# Patient Record
Sex: MALE
[De-identification: names, ages, dates, MRNs, and addresses within clinical notes are randomized per-mention and may not be internally consistent; named-entity substitution may affect disease eponyms.]

---

## 2022-10-11 ENCOUNTER — APPOINTMENT (OUTPATIENT)
Dept: PULMONOLOGY | Facility: CLINIC | Age: 62
End: 2022-10-11

## 2022-10-11 VITALS
HEIGHT: 69 IN | TEMPERATURE: 98 F | WEIGHT: 180 LBS | BODY MASS INDEX: 26.66 KG/M2 | HEART RATE: 57 BPM | SYSTOLIC BLOOD PRESSURE: 115 MMHG | OXYGEN SATURATION: 97 % | DIASTOLIC BLOOD PRESSURE: 74 MMHG

## 2022-10-11 DIAGNOSIS — A92.30 WEST NILE VIRUS INFECTION, UNSPECIFIED: ICD-10-CM

## 2022-10-11 DIAGNOSIS — G25.81 RESTLESS LEGS SYNDROME: ICD-10-CM

## 2022-10-11 DIAGNOSIS — Z78.9 OTHER SPECIFIED HEALTH STATUS: ICD-10-CM

## 2022-10-11 DIAGNOSIS — Z87.891 PERSONAL HISTORY OF NICOTINE DEPENDENCE: ICD-10-CM

## 2022-10-11 PROBLEM — Z00.00 ENCOUNTER FOR PREVENTIVE HEALTH EXAMINATION: Status: ACTIVE | Noted: 2022-10-11

## 2022-10-11 PROCEDURE — 99203 OFFICE O/P NEW LOW 30 MIN: CPT

## 2022-10-11 RX ORDER — PREGABALIN 200 MG/1
200 CAPSULE ORAL
Refills: 0 | Status: ACTIVE | COMMUNITY

## 2022-10-11 NOTE — CONSULT LETTER
[Dear  ___] : Dear  [unfilled], [Consult Letter:] : I had the pleasure of evaluating your patient, [unfilled]. [Please see my note below.] : Please see my note below. [Consult Closing:] : Thank you very much for allowing me to participate in the care of this patient.  If you have any questions, please do not hesitate to contact me. [Sincerely,] : Sincerely, [FreeTextEntry3] : Devorah Dale MD\par \par [employer] [facility]\par Pulmonary, Critical Care, and Sleep Medicine\par

## 2022-10-11 NOTE — PHYSICAL EXAM
No [General Appearance - Well Developed] : well developed [Normal Appearance] : normal appearance [Well Groomed] : well groomed [General Appearance - Well Nourished] : well nourished [No Deformities] : no deformities [General Appearance - In No Acute Distress] : no acute distress [Normal Conjunctiva] : the conjunctiva exhibited no abnormalities [III] : III [Neck Appearance] : the appearance of the neck was normal [] : no respiratory distress [Respiration, Rhythm And Depth] : normal respiratory rhythm and effort [Abnormal Walk] : normal gait [Nail Clubbing] : no clubbing of the fingernails [Cyanosis, Localized] : no localized cyanosis [Skin Color & Pigmentation] : normal skin color and pigmentation [Oriented To Time, Place, And Person] : oriented to person, place, and time [Impaired Insight] : insight and judgment were intact

## 2022-10-11 NOTE — REVIEW OF SYSTEMS
[Lower Extremity Discomfort] : lower extremity discomfort [Irresistible urge to move legs] : irresistible urge to move legs because of lower extremity discomfort [LE discomfort relieved by movement] : lower extremity discomfort relieved by movement [Late day/ Evening symptoms] : late day/evening symptoms [Sleep Disturbances due to LE symptoms] : ~T sleep disturbances due to lower extremity symptoms [Negative] : Psychiatric

## 2022-10-11 NOTE — HISTORY OF PRESENT ILLNESS
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 Started on Lyrica with improvement.  Never had a PSG; pending one in 2 weeks. Does snore and this is bothersome to his wife. Has tried laser and is now using Excite OKSANA. Is pending MAD. Recent history of [place]. \par \par Neuro: Lukasz Stern\par  [ESS] : 7

## 2022-10-11 NOTE — ASSESSMENT
[FreeTextEntry1] : REVIEWED\par 1. HST 4/2022: AHI 4% 9.3; AHI 3% 19.7; T88 <1%\par 2. Ferritin 2021 55; 2022 183\par \par 61yo with "shaky legs" referred for evaluation for snoring. Referred by Dr. Callahan. His clinical history is consistent with RLS as a PSG is required for a diagnosis of PLMD. However, treatment is similar; can c/w Lyrica since he has benefit. Ferritin levels are now above >70 ng/mL after supplementation. He does have between mild and moderate OKSANA which will explain his snoring; excite OKSANA is not an approved treatment modality for moderate OKSANA, but MAD is. He should wait for his PSG results prior to deciding to proceed with MAD treatment; he may have severe OKSANA which would require PAP therapy. All scenarios discussed with him. Follow up with me after PSG.